# Patient Record
Sex: MALE | Race: WHITE | Employment: FULL TIME | URBAN - METROPOLITAN AREA
[De-identification: names, ages, dates, MRNs, and addresses within clinical notes are randomized per-mention and may not be internally consistent; named-entity substitution may affect disease eponyms.]

---

## 2020-05-30 ENCOUNTER — HOSPITAL ENCOUNTER (EMERGENCY)
Age: 25
Discharge: HOME | End: 2020-05-30
Payer: OTHER GOVERNMENT

## 2020-05-30 VITALS
OXYGEN SATURATION: 99 % | DIASTOLIC BLOOD PRESSURE: 92 MMHG | HEIGHT: 72 IN | RESPIRATION RATE: 16 BRPM | HEART RATE: 102 BPM | SYSTOLIC BLOOD PRESSURE: 168 MMHG | TEMPERATURE: 97.8 F | WEIGHT: 227.51 LBS | BODY MASS INDEX: 30.82 KG/M2

## 2020-05-30 PROCEDURE — 96372 THER/PROPH/DIAG INJ SC/IM: CPT

## 2020-05-30 PROCEDURE — 6360000002 HC RX W HCPCS: Performed by: NURSE PRACTITIONER

## 2020-05-30 PROCEDURE — 99282 EMERGENCY DEPT VISIT SF MDM: CPT

## 2020-05-30 RX ORDER — NYSTATIN 100000 [USP'U]/G
POWDER TOPICAL
Qty: 1 BOTTLE | Refills: 0 | Status: SHIPPED | OUTPATIENT
Start: 2020-05-30

## 2020-05-30 RX ORDER — DEXAMETHASONE SODIUM PHOSPHATE 4 MG/ML
10 INJECTION, SOLUTION INTRA-ARTICULAR; INTRALESIONAL; INTRAMUSCULAR; INTRAVENOUS; SOFT TISSUE ONCE
Status: COMPLETED | OUTPATIENT
Start: 2020-05-30 | End: 2020-05-30

## 2020-05-30 RX ORDER — PREDNISONE 20 MG/1
60 TABLET ORAL DAILY
Qty: 15 TABLET | Refills: 0 | Status: SHIPPED | OUTPATIENT
Start: 2020-05-30 | End: 2020-06-04

## 2020-05-30 RX ORDER — DIPHENHYDRAMINE HCL 25 MG
25-50 CAPSULE ORAL EVERY 6 HOURS PRN
Qty: 25 CAPSULE | Refills: 0 | Status: SHIPPED | OUTPATIENT
Start: 2020-05-30

## 2020-05-30 RX ADMIN — DEXAMETHASONE SODIUM PHOSPHATE 10 MG: 4 INJECTION, SOLUTION INTRAMUSCULAR; INTRAVENOUS at 20:57

## 2020-05-30 ASSESSMENT — ENCOUNTER SYMPTOMS
SORE THROAT: 0
COLOR CHANGE: 1
STRIDOR: 0
WHEEZING: 0
CHEST TIGHTNESS: 0
ABDOMINAL PAIN: 0
TROUBLE SWALLOWING: 0
VOICE CHANGE: 0
SHORTNESS OF BREATH: 0

## 2020-05-31 NOTE — ED PROVIDER NOTES
General: No focal deficit present. Mental Status: He is alert and oriented to person, place, and time. Psychiatric:         Mood and Affect: Mood normal.             MEDICAL DECISION MAKING    Vitals:    Vitals:    05/30/20 1929   BP: (!) 168/92   Pulse: 102   Resp: 16   Temp: 97.8 °F (36.6 °C)   TempSrc: Oral   SpO2: 99%   Weight: 227 lb 8.2 oz (103.2 kg)   Height: 6' (1.829 m)       LABS:Labs Reviewed - No data to display     Remainder of labs reviewed and werenegative at this time or not returned at the time of this note. RADIOLOGY:   Non-plain film images such as CT, Ultrasound and MRI are read by the radiologist. TRACY Quiroz CNP have directly visualized the radiologic plain film image(s) with the below findings:        Interpretation per the Radiologist below, if available at the time of this note:    No orders to display        No results found. MEDICAL DECISION MAKING / ED COURSE:      PROCEDURES:   Procedures    None    Patient was given:  Medications   dexamethasone (DECADRON) injection 10 mg (has no administration in time range)       Differential diagnosis: Vasculitis, bacterial skin infection, viral rash, systemic infectious rash, Anaphylaxis, Urticaria, other    Patient seen and examined today for rash. See HPI for patient presentation. Patient is hemodynamically stable, nontoxic, afebrile, and without tachycardia, tachypnea, and hypoxia. Physical exam as above. 72-year-old male lying in bed in no acute distress. Awake alert and oriented. Erythematous nonraised rash to upper thighs, groin, and lower abdomen. No induration or fluctuation. No evidence for Riya's gangrene. It is blanchable and pruritic. I do not appreciate vesicles consistent with poison ivy dermatitis. It does not seem to be consistent with intertrigo, although it is possible so he was prescribed nystatin powder. It does not seem to be infectious so I will not prescribe Keflex at this time. He will be put on a steroid burst pack because that seemed to help the last time. I advised him return for fever or increasing pain. He was given a work note as requested. Patient is afebrile and nontoxic in appearance. The rash is currently without the appearance or clinical features to suggest a more emergent diagnosis such as SJS, HSP, TEN, meningococcemia, endocarditis, syphillis, cellulitis, scabies, herpes simplex or erythema multiform, etc. He was given appropriate discharge instructions. Patient advised to follow-up with their family doctor within 24-48 hours and return to the emergency department for worsening symptoms. At this time, the evidence for any other entities in the differential is insufficient to justify any further testing. This was explained to the patient. The patient was advised that persistent or worsening symptoms will require further evaluation. The patient tolerated their visit well. I evaluated the patient. The physician was available for consultation as needed. The patient and / or the family were informed of the results of any tests, a time was given to answer questions, a plan was proposed and they agreed with plan. CLINICAL IMPRESSION:  1. Rash and other nonspecific skin eruption        DISPOSITION Decision To Discharge 05/30/2020 08:36:35 PM      PATIENT REFERRED TO:  Natalie Nolanmary  [phone]  Call       [facility] Emergency Department  [address] [address]  [phone]  Go to   As needed      DISCHARGE MEDICATIONS:  New Prescriptions    DIPHENHYDRAMINE (BENADRYL) 25 MG CAPSULE    Take 1-2 capsules by mouth every 6 hours as needed for Itching    HYDROCORTISONE 2.5 % CREAM    Apply topically 2 times daily.     NYSTATIN (MYCOSTATIN) 672902 UNIT/GM POWDER    Apply topically 4 times daily as needed to affected area    PREDNISONE (DELTASONE) 20 MG TABLET    Take 3 tablets by mouth daily for 5 days       DISCONTINUED

## 2024-02-15 ENCOUNTER — OFFICE VISIT (OUTPATIENT)
Dept: INTERNAL MEDICINE CLINIC | Age: 29
End: 2024-02-15
Payer: OTHER GOVERNMENT

## 2024-02-15 VITALS
BODY MASS INDEX: 32.14 KG/M2 | DIASTOLIC BLOOD PRESSURE: 80 MMHG | WEIGHT: 237.3 LBS | HEIGHT: 72 IN | SYSTOLIC BLOOD PRESSURE: 125 MMHG | OXYGEN SATURATION: 97 % | HEART RATE: 65 BPM

## 2024-02-15 DIAGNOSIS — F39 MOOD DISORDER (HCC): ICD-10-CM

## 2024-02-15 DIAGNOSIS — Z76.89 RETURN TO WORK EVALUATION: Primary | ICD-10-CM

## 2024-02-15 PROCEDURE — 99215 OFFICE O/P EST HI 40 MIN: CPT | Performed by: STUDENT IN AN ORGANIZED HEALTH CARE EDUCATION/TRAINING PROGRAM

## 2024-02-15 RX ORDER — CITALOPRAM 20 MG/1
20 TABLET ORAL DAILY
COMMUNITY

## 2024-02-15 NOTE — PROGRESS NOTES
intact.      Pupils: Pupils are equal, round, and reactive to light.   Cardiovascular:      Rate and Rhythm: Normal rate.      Heart sounds: Normal heart sounds. No murmur heard.  Pulmonary:      Effort: Pulmonary effort is normal.      Breath sounds: Normal breath sounds.   Abdominal:      General: Bowel sounds are normal.      Palpations: Abdomen is soft.   Musculoskeletal:         General: Normal range of motion.   Skin:     General: Skin is warm.   Neurological:      General: No focal deficit present.      Mental Status: He is alert and oriented to person, place, and time. Mental status is at baseline.   Psychiatric:         Mood and Affect: Mood normal.            During this encounter more than 45 minutes spent in patient care involving a thorough chart review during the patient's previous notes, test results, medications.  We also had a face-to-face encounter and discussed in detail regarding the treatment plan.  Please note that this chart was generated using dragon dictation software.  Although every effort was made to ensure the accuracy of this automated transcription, some errors in transcription may have occurred.      An electronic signature was used to authenticate this note.    --Nay Osullivan MD

## 2024-07-25 ENCOUNTER — OFFICE VISIT (OUTPATIENT)
Dept: INTERNAL MEDICINE CLINIC | Age: 29
End: 2024-07-25

## 2024-07-25 VITALS
WEIGHT: 238 LBS | DIASTOLIC BLOOD PRESSURE: 88 MMHG | BODY MASS INDEX: 32.27 KG/M2 | HEART RATE: 90 BPM | SYSTOLIC BLOOD PRESSURE: 120 MMHG | OXYGEN SATURATION: 97 %

## 2024-07-25 DIAGNOSIS — Z00.8 ENCOUNTER FOR WORK CAPABILITY ASSESSMENT: ICD-10-CM

## 2024-07-25 DIAGNOSIS — F39 MOOD DISORDER (HCC): Primary | ICD-10-CM

## 2024-07-25 RX ORDER — CITALOPRAM 20 MG/1
20 TABLET ORAL DAILY
Qty: 90 TABLET | Refills: 1 | Status: SHIPPED | OUTPATIENT
Start: 2024-07-25

## 2024-07-25 ASSESSMENT — ENCOUNTER SYMPTOMS
EYE DISCHARGE: 0
ABDOMINAL PAIN: 0
GASTROINTESTINAL NEGATIVE: 1
SHORTNESS OF BREATH: 0
EYES NEGATIVE: 1
RESPIRATORY NEGATIVE: 1
ALLERGIC/IMMUNOLOGIC NEGATIVE: 1

## 2024-07-25 NOTE — PROGRESS NOTES
Tye Morrison (:  1995) is a 28 y.o. male,New patient, here for evaluation of the following chief complaint(s):  Forms (fmla)    --- PMHx: autism, mood disorder.    Patient reports that, his mother lives in [facility] and goes to [facility].  She was diagnosed with cancer of her ovaries and kidney.  She is on chemo and radiation and he stayed there to take care of her, went there on  and returned yesterday.    Chart does show that he was seen in the ER on  and [facility] for bilateral ear wax removal.    He has already received his FMLA papers from his prior PCP.  Is requesting for return to work note.    Works at [employer].    Family history positive for ovarian cancer in mother.  Was involved in DUI, has stopped drinking alcohol since a year.  Currently use marijuana.    His mother live in [place], [place] and suffers from poorly differentiated carcinoma of endocervix (liver mets)- actively receiving chemo , off radation.  He travels from [place] to [facility], helps her during the treatment.   Needs 4-5 days/moth.    Assessment & Plan     1. Mood disorder (HCC)-doing well on Celexa, continue the same.  -     citalopram (CELEXA) 20 MG tablet; Take 1 tablet by mouth daily, Disp-90 tablet, R-1Normal  2. Encounter for work capability assessment patient is requesting for FMLA to be filled so that he can go and visit his mother lives in [facility].  His mother suffered from poorly differentiated carcinoma of the endocervix with mets to the liver.  She is actively receiving chemotherapy.  She has completed her radiation.  He helps with appointments and transportation.  Document seen, brought by the patient with his mother's consent.  Paperwork completed.    -     CO WORK RELATED/MED DBLT XM TREATING PHYS          No follow-ups on file.         Subjective   SUBJECTIVE/OBJECTIVE:  HPI    Review of Systems   Constitutional: Negative.  Negative for chills and fever.   HENT: Negative.     Eyes: